# Patient Record
Sex: FEMALE | Race: WHITE | NOT HISPANIC OR LATINO | URBAN - METROPOLITAN AREA
[De-identification: names, ages, dates, MRNs, and addresses within clinical notes are randomized per-mention and may not be internally consistent; named-entity substitution may affect disease eponyms.]

---

## 2024-11-22 ENCOUNTER — OFFICE VISIT (OUTPATIENT)
Dept: URGENT CARE | Age: 29
End: 2024-11-22

## 2024-11-22 VITALS
OXYGEN SATURATION: 97 % | RESPIRATION RATE: 20 BRPM | TEMPERATURE: 98.8 F | DIASTOLIC BLOOD PRESSURE: 84 MMHG | SYSTOLIC BLOOD PRESSURE: 113 MMHG | HEART RATE: 103 BPM

## 2024-11-22 DIAGNOSIS — J06.9 UPPER RESPIRATORY TRACT INFECTION, UNSPECIFIED TYPE: Primary | ICD-10-CM

## 2024-11-22 RX ORDER — ALBUTEROL SULFATE 90 UG/1
2 INHALANT RESPIRATORY (INHALATION) EVERY 4 HOURS PRN
Qty: 8 G | Refills: 0 | Status: SHIPPED | OUTPATIENT
Start: 2024-11-22 | End: 2025-11-22

## 2024-11-22 RX ORDER — NORGESTIMATE AND ETHINYL ESTRADIOL 7DAYSX3 LO
1 KIT ORAL
COMMUNITY

## 2024-11-22 RX ORDER — AZITHROMYCIN 250 MG/1
TABLET, FILM COATED ORAL
Qty: 6 TABLET | Refills: 0 | Status: SHIPPED | OUTPATIENT
Start: 2024-11-22 | End: 2024-11-27

## 2024-11-22 ASSESSMENT — ENCOUNTER SYMPTOMS
FATIGUE: 1
SHORTNESS OF BREATH: 1
COUGH: 1
ACTIVITY CHANGE: 1
CHILLS: 1

## 2024-11-22 NOTE — PROGRESS NOTES
Subjective   Patient ID: Sarah Nicole is a 29 y.o. female. They present today with a chief complaint of Cough (Chest congestion and cough for a week).    History of Present Illness    Cough  Associated symptoms include chills, postnasal drip and shortness of breath.    a 29-year-old female arrives to clinic with chief complaint of cough, congestion, wheezing.  The patient reports having the symptoms over the last week.  She would like to report that 2 weeks ago, she picked up vaping.  She does not know if the vaping caused her to become sick.  She has not use any over-the-counter medications.  She is here for further evaluation health maintenance.    Past Medical History  Allergies as of 11/22/2024    (No Known Allergies)       (Not in a hospital admission)       History reviewed. No pertinent past medical history.    History reviewed. No pertinent surgical history.         Review of Systems  Review of Systems   Constitutional:  Positive for activity change, chills and fatigue.   HENT:  Positive for congestion and postnasal drip.    Respiratory:  Positive for cough and shortness of breath.                                   Objective    Vitals:    11/22/24 0831   BP: 113/84   Pulse: 103   Resp: 20   Temp: 37.1 °C (98.8 °F)   SpO2: 97%     No LMP recorded.    Physical Exam  Constitutional:       Appearance: Normal appearance. She is normal weight.   HENT:      Mouth/Throat:      Pharynx: Posterior oropharyngeal erythema present.   Cardiovascular:      Rate and Rhythm: Normal rate and regular rhythm.   Pulmonary:      Breath sounds: Examination of the right-upper field reveals decreased breath sounds and wheezing. Examination of the left-upper field reveals decreased breath sounds and wheezing. Decreased breath sounds and wheezing present.   Neurological:      Mental Status: She is alert.         Procedures    Point of Care Test & Imaging Results from this visit  No results found for this visit on 11/22/24.   No  results found.    Diagnostic study results (if any) were reviewed by MEI Cardenas.    Assessment/Plan   Allergies, medications, history, and pertinent labs/EKGs/Imaging reviewed by MEI Cardenas.     Medical Decision Making  Upon initial assessment, physical examination does reveal an erythematous pharynx with no tonsillar exudates.  Respiratory examination does reveal diminished lung sounds in the left and right upper lobes in addition to wheezing.  Given her symptoms, this is likely a URI.  I recommend the patient abstains from vaping at this time.  An albuterol inhaler and Z-David was sent.  Over-the-counter medications as discussed.  Follow-up with primary care provider.    As a result of the work-up, the patient was discharged home.  she was informed of her diagnosis and instructed to come back with any concerns or worsening of condition.  she and was agreeable to the plan as discussed above.  she was given the opportunity to ask questions.  All of the patient's questions were answered.    This document was generated using the assistance of voice recognition software. If there are any errors of spelling, grammar, syntax, or meaning; please feel free to contact me directly for clarification.    Orders and Diagnoses  Diagnoses and all orders for this visit:  Upper respiratory tract infection, unspecified type  -     albuterol (Ventolin HFA) 90 mcg/actuation inhaler; Inhale 2 puffs every 4 hours if needed for wheezing or shortness of breath.  -     azithromycin (Zithromax) 250 mg tablet; Take 2 tabs (500 mg) by mouth today, than 1 daily for 4 days.      Medical Admin Record      Patient disposition: Home    Electronically signed by MEI Cardenas  8:39 AM

## 2024-11-24 ENCOUNTER — OFFICE VISIT (OUTPATIENT)
Dept: URGENT CARE | Age: 29
End: 2024-11-24

## 2024-11-24 VITALS
OXYGEN SATURATION: 97 % | TEMPERATURE: 98.8 F | DIASTOLIC BLOOD PRESSURE: 71 MMHG | SYSTOLIC BLOOD PRESSURE: 111 MMHG | HEART RATE: 84 BPM

## 2024-11-24 DIAGNOSIS — T78.40XA ALLERGIC REACTION TO DRUG, INITIAL ENCOUNTER: Primary | ICD-10-CM

## 2024-11-24 PROCEDURE — 99213 OFFICE O/P EST LOW 20 MIN: CPT | Performed by: NURSE PRACTITIONER

## 2024-11-24 RX ORDER — PREDNISONE 20 MG/1
40 TABLET ORAL DAILY
Qty: 10 TABLET | Refills: 0 | Status: SHIPPED | OUTPATIENT
Start: 2024-11-24 | End: 2024-11-29

## 2024-11-24 RX ORDER — MOXIFLOXACIN 5 MG/ML
1 SOLUTION/ DROPS OPHTHALMIC 4 TIMES DAILY
Qty: 10 ML | Refills: 0 | Status: SHIPPED | OUTPATIENT
Start: 2024-11-24 | End: 2024-12-01

## 2024-11-24 NOTE — PROGRESS NOTES
Subjective   Patient ID: Sarah Nicole is a 29 y.o. female. They present today with a chief complaint of Eye Problem (Blisters around eyes, crusty, inside of mouth feels raw).    History of Present Illness  HPI  Pt is a 29 yr old female who presents with blisters around eyes and crusty along with blisters and rash around mouth in ulcers in mouth after starting on abx on friday  Past Medical History  Allergies as of 11/24/2024    (No Known Allergies)       (Not in a hospital admission)       No past medical history on file.    No past surgical history on file.         Review of Systems  Review of Systems  Gen: No fatigue, fever, sweats.  Head: No headache, trauma.  Eyes: No vision loss, double vision, +drainage, eye pain.  ENT: No hearing changes, pain, epistaxis, congestion  Cardiac: No chest pain  Pulmonary: No shortness of breath,  pleuritic pain,   Heme/lymph: No swollen glands  GI: No abdominal pain, nausea, vomiting, diarrhea  : No  dysuria, frequency, urgency, hematuria  Musculoskeletal: No limb pain, joint pain, back pain, joint swelling or stiffness.  Skin: + rashes, pruritus, lumps, lesions.  Neuro: No Numbness, tingling, or weakness.  Psych: No  anxiety     Review of systems is otherwise negative unless stated above or in history of present illness.                             Objective    Vitals:    11/24/24 0843   BP: 111/71   Pulse: 84   Temp: 37.1 °C (98.8 °F)   SpO2: 97%     No LMP recorded.    Physical Exam  General: Vital signs stable, Pt is alert, no acute distress, hemodynamically stable  Eyes: Conjunctiva erythema and drainage in eyes PERRL, EOMs intact  HENMT: Normocephalic, atraumatic, external ears and nose normal, no scars or masses.  No mastoid tenderness. Trachea is midline. No meningeal signs, negative Kernig and Brudzinski, moves neck freely.  No sinus tenderness + ulcers in mouth on lips and tongue  Resp: Respiratory effort is normal, no retractions, no stridor. Lungs CTA, no  wheezes or rhonchi  CV: Heart is regular rate and rhythm.   Skin: + rash around mouth and eyes  Skel: full range of motion of upper and lower extremities.   Neuro: Normal gait, CN II-XII intact, no motor or sensory changes.  Psych: Alert and oriented ×3, judgment is appropriate, normal mood and affect   Procedures    Point of Care Test & Imaging Results from this visit  No results found for this visit on 11/24/24.   No results found.    Diagnostic study results (if any) were reviewed by MEI Melendez.    Assessment/Plan   Allergies, medications, history, and pertinent labs/EKGs/Imaging reviewed by MEI Melendez.     Medical Decision Making  History and physical consistent with medication reaction she is hemodynamically stable.  Will be but on eye drops and steroids and strict precautions to got to ER if worsening symptoms and to stop abx    Orders and Diagnoses  There are no diagnoses linked to this encounter.    Medical Admin Record      Patient disposition: Home    Electronically signed by MEI Melendez  8:48 AM

## 2024-11-24 NOTE — PATIENT INSTRUCTIONS
You are having an allergic reaction stop the antibiotics and start on steroids and eye drops.  If worsening symptoms go to ER immediately

## 2025-01-14 ENCOUNTER — OFFICE VISIT (OUTPATIENT)
Dept: URGENT CARE | Age: 30
End: 2025-01-14

## 2025-01-14 VITALS
OXYGEN SATURATION: 98 % | RESPIRATION RATE: 18 BRPM | HEART RATE: 106 BPM | SYSTOLIC BLOOD PRESSURE: 130 MMHG | DIASTOLIC BLOOD PRESSURE: 87 MMHG

## 2025-01-14 DIAGNOSIS — L25.9 CONTACT DERMATITIS, UNSPECIFIED CONTACT DERMATITIS TYPE, UNSPECIFIED TRIGGER: Primary | ICD-10-CM

## 2025-01-14 PROCEDURE — 87070 CULTURE OTHR SPECIMN AEROBIC: CPT

## 2025-01-14 PROCEDURE — 87529 HSV DNA AMP PROBE: CPT

## 2025-01-14 PROCEDURE — 87205 SMEAR GRAM STAIN: CPT

## 2025-01-14 PROCEDURE — 99213 OFFICE O/P EST LOW 20 MIN: CPT | Performed by: NURSE PRACTITIONER

## 2025-01-14 PROCEDURE — 87075 CULTR BACTERIA EXCEPT BLOOD: CPT

## 2025-01-14 RX ORDER — METHYLPREDNISOLONE 4 MG/1
TABLET ORAL
Qty: 21 TABLET | Refills: 0 | Status: SHIPPED | OUTPATIENT
Start: 2025-01-14 | End: 2025-01-20

## 2025-01-14 ASSESSMENT — ENCOUNTER SYMPTOMS
EYE PAIN: 0
ANOREXIA: 0
SORE THROAT: 0
VOMITING: 0
NAIL CHANGES: 0
FEVER: 0
RHINORRHEA: 0
COUGH: 0
DIARRHEA: 0
SHORTNESS OF BREATH: 0
FATIGUE: 0

## 2025-01-14 NOTE — PROGRESS NOTES
Subjective   Patient ID: Sarah Nicole is a 29 y.o. female. They present today with a chief complaint of Rash (Pt c/o persistent facial redness and rash since administration of azithromycin 11/2024, pt reports new onset of rash and oozing on b/l neck and ears).    History of Present Illness  29-year-old female presents with rash with clear yellow discharge on the left side of the neck and left ear for 1-1/2 months.  About 1-1/2 ago, patient took azithromycin and developed allergic reaction to it.  She was prescribed oral prednisone and her symptoms improved but had been on and off since.  Initially the rash appeared on all over her body and disappeared after the oral prednisone.  However, the rash on the face persisted and had been intermittent since.  Recently the rash started to drain clear yellow fluid.  Patient reported pain rated 2 out of 10.  Patient denied itchiness on the rash but reported redness. itchiness on her face.  Benadryl did not improve the symptoms.  Patient reported history of eczema and had this type of rash before.  Patient had appointment to see her dermatologist in 2 weeks.      History provided by:  Patient   used: No    Rash  This is a chronic problem. Episode onset: 1.5 months. The problem has been waxing and waning since onset. Location: left side of neck and bilateral ears. The rash is characterized by redness (clear yellow discharge). She was exposed to a new medication. Pertinent negatives include no anorexia, congestion, cough, diarrhea, eye pain, facial edema, fatigue, fever, joint pain, nail changes, rhinorrhea, shortness of breath, sore throat or vomiting. Past treatments include antihistamine. The treatment provided no relief. Her past medical history is significant for eczema.       Past Medical History  Allergies as of 01/14/2025 - Reviewed 01/14/2025   Allergen Reaction Noted    Azithromycin Hives and Itching 01/14/2025       (Not in a hospital  admission)       History reviewed. No pertinent past medical history.    History reviewed. No pertinent surgical history.         Review of Systems  Review of Systems   Constitutional:  Negative for fatigue and fever.   HENT:  Negative for congestion, rhinorrhea and sore throat.    Eyes:  Negative for pain.   Respiratory:  Negative for cough and shortness of breath.    Gastrointestinal:  Negative for anorexia, diarrhea and vomiting.   Musculoskeletal:  Negative for joint pain.   Skin:  Positive for rash. Negative for nail changes.              Objective    Vitals:    01/14/25 1328   BP: 130/87   Pulse: 106   Resp: 18   SpO2: 98%     No LMP recorded.    Physical Exam  Vitals and nursing note reviewed.   Constitutional:       Appearance: Normal appearance.   HENT:      Head: Normocephalic and atraumatic.      Right Ear: Hearing and external ear normal.      Left Ear: Hearing and external ear normal.      Ears:      Comments: Scaliness and dryness noted in bilateral ear canals. Dry yellow discharge noted in the left ear canal. Two oval patches of dry yellow discharge noted on the left side of the neck.  No active bleeding, active discharge and tenderness noted. Erythematous patches noted on the face. No bleeding, discharge or tenderness.      Nose: Nose normal.      Mouth/Throat:      Lips: Pink.      Mouth: Mucous membranes are moist.      Pharynx: Oropharynx is clear.   Cardiovascular:      Rate and Rhythm: Normal rate and regular rhythm.   Pulmonary:      Effort: Pulmonary effort is normal.      Breath sounds: Normal breath sounds.   Neurological:      Mental Status: She is alert.         Procedures    Point of Care Test & Imaging Results from this visit  No results found for this visit on 01/14/25.   No results found.    Diagnostic study results (if any) were reviewed by MEI Vallejo.    Assessment/Plan   Allergies, medications, history, and pertinent labs/EKGs/Imaging reviewed by MEI Vallejo.      Medical Decision Making  Testing: Wound culture and HSV culture from the floor.  He reports that she  Differential: 1) infected atopic dermatitis 2) eczema herpeticum   Impression: Infected contact dermatitis. Pt declined oral antibiotic and oral antiviral at this time as she was concerned of her allergic reaction. She would like to wait for the wound culture results before starting any medications. However, she was agreeable to taking Medrol Dosepack which helped with the rash and eczema in the past.  Take prednisone as instructed.  Follow up with your dermatologist per your appointment.  Patient verbalized understanding of the instructions and left in stable condition.      Orders and Diagnoses  Diagnoses and all orders for this visit:  Contact dermatitis, unspecified contact dermatitis type, unspecified trigger  -     methylPREDNISolone (Medrol Dospak) 4 mg tablets; Take as directed on package.  -     HSV PCR, Skin/Mucosa  -     Tissue/Wound Culture/Smear      Medical Admin Record      Patient disposition: Home    Electronically signed by MEI Vallejo  2:06 PM

## 2025-01-15 LAB
HSV1 DNA SKIN QL NAA+PROBE: NOT DETECTED
HSV2 DNA SKIN QL NAA+PROBE: NOT DETECTED

## 2025-01-17 LAB
BACTERIA SPEC CULT: NORMAL
GRAM STN SPEC: NORMAL
GRAM STN SPEC: NORMAL

## 2025-01-28 ENCOUNTER — TELEPHONE (OUTPATIENT)
Dept: ALLERGY | Facility: HOSPITAL | Age: 30
End: 2025-01-28

## 2025-01-29 ENCOUNTER — APPOINTMENT (OUTPATIENT)
Dept: DERMATOLOGY | Facility: CLINIC | Age: 30
End: 2025-01-29

## 2025-02-04 ENCOUNTER — APPOINTMENT (OUTPATIENT)
Dept: DERMATOLOGY | Facility: CLINIC | Age: 30
End: 2025-02-04

## 2025-03-31 ENCOUNTER — APPOINTMENT (OUTPATIENT)
Dept: RADIOLOGY | Facility: CLINIC | Age: 30
End: 2025-03-31

## 2025-04-16 ENCOUNTER — APPOINTMENT (OUTPATIENT)
Dept: ALLERGY | Facility: CLINIC | Age: 30
End: 2025-04-16

## 2025-05-13 ENCOUNTER — APPOINTMENT (OUTPATIENT)
Dept: DERMATOLOGY | Facility: CLINIC | Age: 30
End: 2025-05-13

## 2025-06-09 ENCOUNTER — HOSPITAL ENCOUNTER (OUTPATIENT)
Dept: RADIOLOGY | Facility: CLINIC | Age: 30
Discharge: HOME | End: 2025-06-09

## 2025-06-09 DIAGNOSIS — R10.2 PELVIC AND PERINEAL PAIN: ICD-10-CM

## 2025-06-09 DIAGNOSIS — N92.1 EXCESSIVE AND FREQUENT MENSTRUATION WITH IRREGULAR CYCLE: ICD-10-CM

## 2025-06-09 PROCEDURE — 76830 TRANSVAGINAL US NON-OB: CPT | Performed by: RADIOLOGY

## 2025-06-09 PROCEDURE — 76856 US EXAM PELVIC COMPLETE: CPT | Performed by: RADIOLOGY

## 2025-06-09 PROCEDURE — 76856 US EXAM PELVIC COMPLETE: CPT
